# Patient Record
Sex: FEMALE | Race: WHITE | NOT HISPANIC OR LATINO
[De-identification: names, ages, dates, MRNs, and addresses within clinical notes are randomized per-mention and may not be internally consistent; named-entity substitution may affect disease eponyms.]

---

## 2017-02-20 PROBLEM — Z86.39 HISTORY OF HYPERLIPIDEMIA: Status: RESOLVED | Noted: 2017-02-20 | Resolved: 2017-02-20

## 2017-02-20 PROBLEM — Z92.21 HISTORY OF CHEMOTHERAPY: Status: RESOLVED | Noted: 2017-02-20 | Resolved: 2017-02-20

## 2017-02-20 PROBLEM — Z80.9 FAMILY HISTORY OF MALIGNANT NEOPLASM: Status: ACTIVE | Noted: 2017-02-20

## 2017-02-21 ENCOUNTER — APPOINTMENT (OUTPATIENT)
Dept: SURGERY | Facility: CLINIC | Age: 57
End: 2017-02-21

## 2017-02-21 VITALS
DIASTOLIC BLOOD PRESSURE: 73 MMHG | WEIGHT: 139.22 LBS | HEART RATE: 80 BPM | RESPIRATION RATE: 14 BRPM | SYSTOLIC BLOOD PRESSURE: 122 MMHG | OXYGEN SATURATION: 98 % | TEMPERATURE: 98 F | BODY MASS INDEX: 21.1 KG/M2 | HEIGHT: 68 IN

## 2017-02-21 DIAGNOSIS — Z83.0 FAMILY HISTORY OF HUMAN IMMUNODEFICIENCY VIRUS [HIV] DISEASE: ICD-10-CM

## 2017-02-21 DIAGNOSIS — F12.90 CANNABIS USE, UNSPECIFIED, UNCOMPLICATED: ICD-10-CM

## 2017-02-21 DIAGNOSIS — Z86.39 PERSONAL HISTORY OF OTHER ENDOCRINE, NUTRITIONAL AND METABOLIC DISEASE: ICD-10-CM

## 2017-02-21 DIAGNOSIS — Z86.79 PERSONAL HISTORY OF OTHER DISEASES OF THE CIRCULATORY SYSTEM: ICD-10-CM

## 2017-02-21 DIAGNOSIS — Z80.9 FAMILY HISTORY OF MALIGNANT NEOPLASM, UNSPECIFIED: ICD-10-CM

## 2017-02-21 DIAGNOSIS — Z92.21 PERSONAL HISTORY OF ANTINEOPLASTIC CHEMOTHERAPY: ICD-10-CM

## 2017-02-21 DIAGNOSIS — Z78.9 OTHER SPECIFIED HEALTH STATUS: ICD-10-CM

## 2017-02-21 DIAGNOSIS — F17.200 NICOTINE DEPENDENCE, UNSPECIFIED, UNCOMPLICATED: ICD-10-CM

## 2017-02-21 DIAGNOSIS — Z92.3 PERSONAL HISTORY OF IRRADIATION: ICD-10-CM

## 2017-02-21 DIAGNOSIS — Z80.3 FAMILY HISTORY OF MALIGNANT NEOPLASM OF BREAST: ICD-10-CM

## 2017-02-21 RX ORDER — LETROZOLE TABLETS 2.5 MG/1
2.5 TABLET, FILM COATED ORAL
Refills: 0 | Status: ACTIVE | COMMUNITY

## 2017-02-21 RX ORDER — PANTOPRAZOLE 40 MG/1
40 TABLET, DELAYED RELEASE ORAL
Refills: 0 | Status: ACTIVE | COMMUNITY

## 2017-02-21 RX ORDER — LOSARTAN POTASSIUM 25 MG/1
25 TABLET, FILM COATED ORAL
Refills: 0 | Status: ACTIVE | COMMUNITY

## 2017-06-05 ENCOUNTER — APPOINTMENT (OUTPATIENT)
Dept: SURGERY | Facility: CLINIC | Age: 57
End: 2017-06-05

## 2017-06-05 VITALS
SYSTOLIC BLOOD PRESSURE: 101 MMHG | HEART RATE: 84 BPM | OXYGEN SATURATION: 95 % | RESPIRATION RATE: 16 BRPM | DIASTOLIC BLOOD PRESSURE: 65 MMHG | TEMPERATURE: 97.9 F | HEIGHT: 68 IN

## 2017-09-11 ENCOUNTER — APPOINTMENT (OUTPATIENT)
Dept: SURGERY | Facility: CLINIC | Age: 57
End: 2017-09-11

## 2017-11-28 ENCOUNTER — APPOINTMENT (OUTPATIENT)
Dept: SURGERY | Facility: CLINIC | Age: 57
End: 2017-11-28
Payer: MEDICARE

## 2017-11-28 VITALS — HEIGHT: 68 IN | BODY MASS INDEX: 21.12 KG/M2 | WEIGHT: 139.33 LBS

## 2017-11-28 VITALS
SYSTOLIC BLOOD PRESSURE: 116 MMHG | RESPIRATION RATE: 13 BRPM | HEART RATE: 86 BPM | DIASTOLIC BLOOD PRESSURE: 76 MMHG | OXYGEN SATURATION: 96 %

## 2017-11-28 DIAGNOSIS — Z00.00 ENCOUNTER FOR GENERAL ADULT MEDICAL EXAMINATION W/OUT ABNORMAL FINDINGS: ICD-10-CM

## 2017-11-28 DIAGNOSIS — M79.604 PAIN IN RIGHT LEG: ICD-10-CM

## 2017-11-28 DIAGNOSIS — Z90.11 ACQUIRED ABSENCE OF RIGHT BREAST AND NIPPLE: ICD-10-CM

## 2017-11-28 PROCEDURE — 99214 OFFICE O/P EST MOD 30 MIN: CPT

## 2017-12-01 ENCOUNTER — APPOINTMENT (OUTPATIENT)
Dept: SURGERY | Facility: CLINIC | Age: 57
End: 2017-12-01
Payer: MEDICARE

## 2017-12-01 DIAGNOSIS — Z13.79 ENCOUNTER FOR OTHER SCREENING FOR GENETIC AND CHROMOSOMAL ANOMALIES: ICD-10-CM

## 2017-12-01 PROCEDURE — 99024 POSTOP FOLLOW-UP VISIT: CPT

## 2017-12-17 PROBLEM — M79.604: Status: ACTIVE | Noted: 2017-06-05

## 2017-12-17 PROBLEM — Z90.11 HISTORY OF RIGHT TOTAL MASTECTOMY: Status: ACTIVE | Noted: 2017-12-17

## 2017-12-18 PROBLEM — Z13.79 GENETIC TESTING: Status: ACTIVE | Noted: 2017-12-18

## 2018-04-16 ENCOUNTER — APPOINTMENT (OUTPATIENT)
Dept: SURGERY | Facility: CLINIC | Age: 58
End: 2018-04-16
Payer: MEDICARE

## 2018-04-16 VITALS
HEIGHT: 66 IN | HEART RATE: 65 BPM | BODY MASS INDEX: 22.76 KG/M2 | TEMPERATURE: 98.1 F | SYSTOLIC BLOOD PRESSURE: 114 MMHG | OXYGEN SATURATION: 96 % | WEIGHT: 141.64 LBS | DIASTOLIC BLOOD PRESSURE: 73 MMHG

## 2018-04-16 DIAGNOSIS — C50.411 MALIGNANT NEOPLASM OF UPPER-OUTER QUADRANT OF RIGHT FEMALE BREAST: ICD-10-CM

## 2018-04-16 PROCEDURE — 99214 OFFICE O/P EST MOD 30 MIN: CPT

## 2018-04-16 RX ORDER — PALBOCICLIB 125 MG/1
125 CAPSULE ORAL
Qty: 21 | Refills: 0 | Status: ACTIVE | COMMUNITY
Start: 2017-01-12

## 2018-04-16 RX ORDER — DIAZEPAM 5 MG/1
5 TABLET ORAL
Refills: 0 | Status: DISCONTINUED | COMMUNITY
End: 2018-04-16

## 2018-04-16 RX ORDER — VARENICLINE TARTRATE 0.5 (11)-1
0.5 MG X 11 & KIT ORAL
Qty: 53 | Refills: 0 | Status: DISCONTINUED | COMMUNITY
Start: 2017-11-09

## 2018-04-16 RX ORDER — NAPROXEN 500 MG/1
500 TABLET, DELAYED RELEASE ORAL
Qty: 60 | Refills: 0 | Status: DISCONTINUED | COMMUNITY
Start: 2017-06-05 | End: 2018-04-16

## 2018-04-16 RX ORDER — AZITHROMYCIN 250 MG/1
250 TABLET, FILM COATED ORAL
Qty: 6 | Refills: 0 | Status: DISCONTINUED | COMMUNITY
Start: 2017-11-09

## 2018-04-16 RX ORDER — OXYCODONE HYDROCHLORIDE 5 MG/1
5 CAPSULE ORAL
Refills: 0 | Status: DISCONTINUED | COMMUNITY
End: 2018-04-16

## 2018-06-11 ENCOUNTER — APPOINTMENT (OUTPATIENT)
Dept: SURGERY | Facility: CLINIC | Age: 58
End: 2018-06-11
Payer: MEDICARE

## 2018-06-11 VITALS
DIASTOLIC BLOOD PRESSURE: 84 MMHG | SYSTOLIC BLOOD PRESSURE: 141 MMHG | WEIGHT: 140.32 LBS | HEART RATE: 69 BPM | OXYGEN SATURATION: 96 % | TEMPERATURE: 98.2 F | BODY MASS INDEX: 22.55 KG/M2 | HEIGHT: 66 IN

## 2018-06-11 DIAGNOSIS — N64.4 MASTODYNIA: ICD-10-CM

## 2018-06-11 PROCEDURE — 99215 OFFICE O/P EST HI 40 MIN: CPT

## 2018-06-11 RX ORDER — CARVEDILOL 6.25 MG/1
6.25 TABLET, FILM COATED ORAL
Refills: 0 | Status: ACTIVE | COMMUNITY

## 2018-06-21 ENCOUNTER — OUTPATIENT (OUTPATIENT)
Dept: OUTPATIENT SERVICES | Facility: HOSPITAL | Age: 58
LOS: 1 days | Discharge: HOME | End: 2018-06-21

## 2018-06-21 DIAGNOSIS — C50.919 MALIGNANT NEOPLASM OF UNSPECIFIED SITE OF UNSPECIFIED FEMALE BREAST: ICD-10-CM

## 2018-06-22 ENCOUNTER — APPOINTMENT (OUTPATIENT)
Dept: HEMATOLOGY ONCOLOGY | Facility: CLINIC | Age: 58
End: 2018-06-22
Payer: MEDICARE

## 2018-06-22 VITALS
SYSTOLIC BLOOD PRESSURE: 118 MMHG | OXYGEN SATURATION: 95 % | BODY MASS INDEX: 22.69 KG/M2 | DIASTOLIC BLOOD PRESSURE: 75 MMHG | WEIGHT: 142.86 LBS | TEMPERATURE: 98.5 F | HEIGHT: 66.34 IN | HEART RATE: 72 BPM

## 2018-06-22 PROCEDURE — 99204 OFFICE O/P NEW MOD 45 MIN: CPT

## 2018-06-22 RX ORDER — TIOTROPIUM BROMIDE 18 UG/1
18 CAPSULE ORAL; RESPIRATORY (INHALATION)
Qty: 90 | Refills: 0 | Status: ACTIVE | COMMUNITY
Start: 2018-06-12

## 2018-06-22 RX ORDER — DENOSUMAB 120 MG/1.7ML
120 INJECTION SUBCUTANEOUS
Refills: 0 | Status: ACTIVE | COMMUNITY
Start: 2018-06-22

## 2018-07-27 PROBLEM — F12.90 USES MARIJUANA: Status: ACTIVE | Noted: 2017-02-21

## 2018-08-02 ENCOUNTER — OUTPATIENT (OUTPATIENT)
Dept: OUTPATIENT SERVICES | Facility: HOSPITAL | Age: 58
LOS: 1 days | Discharge: HOME | End: 2018-08-02

## 2018-08-02 DIAGNOSIS — C50.919 MALIGNANT NEOPLASM OF UNSPECIFIED SITE OF UNSPECIFIED FEMALE BREAST: ICD-10-CM

## 2018-08-09 ENCOUNTER — APPOINTMENT (OUTPATIENT)
Dept: HEMATOLOGY ONCOLOGY | Facility: CLINIC | Age: 58
End: 2018-08-09
Payer: MEDICARE

## 2018-08-09 VITALS
DIASTOLIC BLOOD PRESSURE: 75 MMHG | HEIGHT: 66.34 IN | BODY MASS INDEX: 22.67 KG/M2 | OXYGEN SATURATION: 98 % | SYSTOLIC BLOOD PRESSURE: 147 MMHG | TEMPERATURE: 98.1 F | HEART RATE: 62 BPM | WEIGHT: 142.75 LBS

## 2018-08-09 PROCEDURE — 99213 OFFICE O/P EST LOW 20 MIN: CPT

## 2018-10-29 ENCOUNTER — APPOINTMENT (OUTPATIENT)
Dept: SURGERY | Facility: CLINIC | Age: 58
End: 2018-10-29
Payer: MEDICARE

## 2018-10-29 VITALS
TEMPERATURE: 98.2 F | DIASTOLIC BLOOD PRESSURE: 79 MMHG | HEART RATE: 72 BPM | WEIGHT: 140 LBS | OXYGEN SATURATION: 99 % | HEIGHT: 66 IN | SYSTOLIC BLOOD PRESSURE: 135 MMHG | BODY MASS INDEX: 22.5 KG/M2

## 2018-10-29 PROCEDURE — 99214 OFFICE O/P EST MOD 30 MIN: CPT

## 2019-10-28 ENCOUNTER — APPOINTMENT (OUTPATIENT)
Dept: SURGERY | Facility: CLINIC | Age: 59
End: 2019-10-28
Payer: MEDICARE

## 2019-11-18 ENCOUNTER — APPOINTMENT (OUTPATIENT)
Dept: SURGERY | Facility: CLINIC | Age: 59
End: 2019-11-18
Payer: MEDICARE

## 2019-11-18 VITALS — DIASTOLIC BLOOD PRESSURE: 74 MMHG | SYSTOLIC BLOOD PRESSURE: 126 MMHG

## 2019-11-18 DIAGNOSIS — C50.919 MALIGNANT NEOPLASM OF UNSPECIFIED SITE OF UNSPECIFIED FEMALE BREAST: ICD-10-CM

## 2019-11-18 DIAGNOSIS — Z85.3 ENCOUNTER FOR FOLLOW-UP EXAMINATION AFTER COMPLETED TREATMENT FOR MALIGNANT NEOPLASM: ICD-10-CM

## 2019-11-18 DIAGNOSIS — Z08 ENCOUNTER FOR FOLLOW-UP EXAMINATION AFTER COMPLETED TREATMENT FOR MALIGNANT NEOPLASM: ICD-10-CM

## 2019-11-18 PROCEDURE — 99214 OFFICE O/P EST MOD 30 MIN: CPT

## 2019-11-18 NOTE — HISTORY OF PRESENT ILLNESS
[FreeTextEntry1] : Internist: Dr. EDWARD Macias \par Medical Oncologist: MD Angeles\par Radiation Oncologist: MD Dillan\par \par I had the pleasure of seeing Muriel Ferrari in the office for a follow up visit to review left diagnostic mammogram from left calcification seen on Screening mammogram 4/2018.\par \par Muriel is a ariane 56 yo postmenopausal female who in 6/2015 was diagnosed with metastatic right breast cancer and imaging demonstrated 3 spiculated masses in the right upper outer quadrant, and on clinical examination there was breast deformity and skin retraction with prominent axillary nodes. The biopsy demonstrated IDC grade 3, ER+ WA+ [id number] negative with local and distant metastasis. Muriel had stated that she had noticed a lump in the right breast several months (almost a year) earlier but had not undergone medical treatment because she did not have health insurance. She was found to have bone metastasis which responded well to Femara/Ibrance. She also received radiation treatment to osseous lesions. She underwent a palliative mastectomy 3/21/2016 and tolerated the procedure well.\par \[facility] Denies new headaches, blurry vision, chest pain, SOB, abdominal pain, changes in or new bone pain, difficulty walking or joint aches. She states she has not received IV Herceptin for the past 10 weeks due to echo EF of 56% (as per patient). She states [facility] infusion center will not give her infusions due to that level even though she is within range. She saw her own cardiologist Dr. Ignacio and [facility] cardiologist -oncologist Dr. Villarreal who both told her echo in March and in June have remained unchanged in percentage. She continues to take her hormonal therapy.\par \par She underwent an echo on 1/6/2017 due to chronic fatigue with no abnormal findings. She reports she had a PET/CT 3/2017 with stable no avid sclerotic lesions, resolution of left nipple/axillary activity and findings of subcentimeter pulmonary nodules, non avid uptake along the left mandible. \par \par She is currently on medical marijuana secondary to chronic bone pain and reports partial relief. Patient states improved fatigue levels and states she is now able to perform ADL's and exercises 2-3 days per week. \par \par Imaging: [facility]\par 11/4/2019 Diagnostic left mammogram/tomosynthesis\par Impression: No mammographic evidence of malignancy BIRADS 1: negative\par 10/16/2018: Left Diagnostic mammogram \par Impression: BIRADS 2 Benign.\par \par We reviewed clinical breast exam and recent imaging. She understands the follow up left breast mammogram demonstrated benign dystrophic calcifications and recommendation was to continue annual screening mammogram. Clinical breast exam is benign. She reported the left breast surgical site scar tissue alarms her at time but clinical breast is stable. She also reported she keeps the holes from her nipple ring clean so that she does not develop an abscess later on. Recommendation to continue annual screening left mammogram and sonogram in April and follow up exam after imaging. She understands and scripts provided.\par \par \par All questions were answered.\par \par

## 2019-11-18 NOTE — PHYSICAL EXAM
[Normocephalic] : normocephalic [Atraumatic] : atraumatic [Sclera nonicteric] : sclera nonicteric [PERRL] : pupils equal, round and reactive to light [No Supraclavicular Adenopathy] : no supraclavicular adenopathy [Supple] : supple [Normal Sinus Rhythm] : normal sinus rhythm [Clear to Auscultation Bilat] : clear to auscultation bilaterally [No dominant masses] : no dominant masses in right breast  [Examined in the supine and seated position] : examined in the supine and seated position [No Nipple Retraction] : no right nipple retraction [No dominant masses] : no dominant masses left breast [No Nipple Discharge] : no left nipple discharge [Breast Nipple Inversion Left] : nipple not inverted [Breast Nipple Retraction Left] : nipple not retracted [Breast Nipple Flattening Left] : nipple not flattened [Breast Nipple Fissures Left] : nipple not fissured [Breast Abnormal Lactation (Galactorrhea) Left] : no galactorrhea [Breast Abnormal Secretion Bloody Fluid Left] : no bloody discharge [Breast Abnormal Secretion Opalescent Fluid Left] : no milky discharge [Breast Abnormal Secretion Serous Fluid Left] : no serous discharge [No Axillary Lymphadenopathy] : no left axillary lymphadenopathy [Soft] : abdomen soft [No Edema] : no edema [No Ulceration] : no ulceration [No Rashes] : no rashes [de-identified] : s/p right mastectomy without reconstruction. Well healed incisions. No skin changes.  No evidence of skin nodules, lumps or changes. [de-identified] : Left breast normal to palpation.  Well healed lateral scar.  No evidence of dominant mass. No skin changes or nipple discharge. [de-identified] : right distal, lateral thigh induration and erythema

## 2019-11-18 NOTE — ASSESSMENT
[FreeTextEntry1] : Patient interested in right breast reconstruction and left mastectomy.  We will refer her to Dr. Jose- Reconstruction and consult with Dr. Reynoso regarding the possible interruption in therapy to accommodate. Review of recent left mammogram is negative.\par 1. Left mammogram annually\par 2. Plan for surgery\par 3. Follow up with Dr. Reynoso

## 2021-11-24 ENCOUNTER — RESULT REVIEW (OUTPATIENT)
Age: 61
End: 2021-11-24

## 2021-11-30 ENCOUNTER — TRANSCRIPTION ENCOUNTER (OUTPATIENT)
Age: 61
End: 2021-11-30

## 2023-10-01 PROBLEM — Z92.3 HISTORY OF RADIATION THERAPY: Status: RESOLVED | Noted: 2017-02-20 | Resolved: 2023-10-01
